# Patient Record
Sex: MALE | Race: WHITE | NOT HISPANIC OR LATINO | URBAN - METROPOLITAN AREA
[De-identification: names, ages, dates, MRNs, and addresses within clinical notes are randomized per-mention and may not be internally consistent; named-entity substitution may affect disease eponyms.]

---

## 2023-03-20 ENCOUNTER — OFFICE VISIT (OUTPATIENT)
Dept: URGENT CARE | Facility: CLINIC | Age: 48
End: 2023-03-20
Payer: COMMERCIAL

## 2023-03-20 VITALS
HEIGHT: 71 IN | SYSTOLIC BLOOD PRESSURE: 135 MMHG | WEIGHT: 175 LBS | RESPIRATION RATE: 18 BRPM | HEART RATE: 81 BPM | TEMPERATURE: 99 F | BODY MASS INDEX: 24.5 KG/M2 | DIASTOLIC BLOOD PRESSURE: 91 MMHG | OXYGEN SATURATION: 99 %

## 2023-03-20 DIAGNOSIS — M54.16 LUMBAR BACK PAIN WITH RADICULOPATHY AFFECTING RIGHT LOWER EXTREMITY: ICD-10-CM

## 2023-03-20 DIAGNOSIS — J02.0 STREP THROAT: ICD-10-CM

## 2023-03-20 DIAGNOSIS — J02.9 SORE THROAT: Primary | ICD-10-CM

## 2023-03-20 DIAGNOSIS — M79.669 CALF PAIN, UNSPECIFIED LATERALITY: ICD-10-CM

## 2023-03-20 LAB
CTP QC/QA: YES
MOLECULAR STREP A: POSITIVE

## 2023-03-20 PROCEDURE — 96372 PR INJECTION,THERAP/PROPH/DIAG2ST, IM OR SUBCUT: ICD-10-PCS | Mod: ,,,

## 2023-03-20 PROCEDURE — 96372 THER/PROPH/DIAG INJ SC/IM: CPT | Mod: ,,,

## 2023-03-20 PROCEDURE — 99213 PR OFFICE/OUTPT VISIT, EST, LEVL III, 20-29 MIN: ICD-10-PCS | Mod: 25,,,

## 2023-03-20 PROCEDURE — 99213 OFFICE O/P EST LOW 20 MIN: CPT | Mod: 25,,,

## 2023-03-20 PROCEDURE — 87651 POCT STREP A MOLECULAR: ICD-10-PCS | Mod: QW,,,

## 2023-03-20 PROCEDURE — 87651 STREP A DNA AMP PROBE: CPT | Mod: QW,,,

## 2023-03-20 RX ORDER — DICLOFENAC SODIUM 50 MG/1
50 TABLET, DELAYED RELEASE ORAL 3 TIMES DAILY
Qty: 9 TABLET | Refills: 0 | Status: SHIPPED | OUTPATIENT
Start: 2023-03-20 | End: 2023-03-23

## 2023-03-20 RX ORDER — CYCLOBENZAPRINE HCL 5 MG
5 TABLET ORAL 3 TIMES DAILY PRN
Qty: 15 TABLET | Refills: 0 | Status: SHIPPED | OUTPATIENT
Start: 2023-03-20 | End: 2023-03-25

## 2023-03-20 RX ORDER — DEXAMETHASONE SODIUM PHOSPHATE 100 MG/10ML
8 INJECTION INTRAMUSCULAR; INTRAVENOUS
Status: COMPLETED | OUTPATIENT
Start: 2023-03-20 | End: 2023-03-20

## 2023-03-20 RX ORDER — AMOXICILLIN 500 MG/1
500 CAPSULE ORAL EVERY 12 HOURS
Qty: 20 CAPSULE | Refills: 0 | Status: SHIPPED | OUTPATIENT
Start: 2023-03-20 | End: 2023-03-30

## 2023-03-20 RX ADMIN — DEXAMETHASONE SODIUM PHOSPHATE 8 MG: 100 INJECTION INTRAMUSCULAR; INTRAVENOUS at 01:03

## 2023-03-20 NOTE — PROGRESS NOTES
"Subjective:       Patient ID: Benny Ontiveros is a 47 y.o. male.    Vitals:  height is 5' 11" (1.803 m) and weight is 79.4 kg (175 lb). His temperature is 98.6 °F (37 °C). His blood pressure is 135/91 (abnormal) and his pulse is 81. His respiration is 18 and oxygen saturation is 99%.     Chief Complaint: No chief complaint on file.    A 47 y.o. male presents to clinic w/ c/o 2 lumps w/ pain in R lower leg x2wks, concerned about possible DVT. 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 Positive strep exposure (wife). Alleviating factors used include Tylenol and Ibuprofen w/ minimal relief.  He denies any chest pain, sob, cp, n/v/d, abdominal complaints, rash, difficulty swallowing, neck stiffness, or changes in intake or output. He denies any saddle anesthesia, extremity weakness, sudden loss of bowel or bladder function, temperature or sensation changes to the right lower extremity.       Constitution: Positive for fever.   HENT:  Positive for sore throat. Negative for trouble swallowing.    Neck: neck negative.   Cardiovascular: Negative.    Eyes: Negative.    Respiratory:  Negative for cough, shortness of breath, wheezing and asthma.    Endocrine: negative.   Genitourinary:  Negative for dysuria, urine decreased and bladder incontinence.   Musculoskeletal:  Positive for back pain, pain with walking and muscle ache.   Skin: Negative.    Allergic/Immunologic: Negative for asthma.     Objective:      Physical Exam   Constitutional: He is oriented to person, place, and time. He appears well-developed. He is cooperative.  Non-toxic appearance. He does not appear ill. No distress.   HENT:   Head: Normocephalic and atraumatic.   Ears:   Right Ear: Hearing, tympanic membrane and external ear normal.   Left Ear: Hearing, tympanic membrane and external ear normal.   Nose: No congestion. "   Mouth/Throat: Mucous membranes are normal. Mucous membranes are moist. Posterior oropharyngeal erythema present. Oropharynx is clear.   Eyes: Conjunctivae and lids are normal.   Neck: Trachea normal and phonation normal. Neck supple. No edema present. No erythema present. No neck rigidity present.   Cardiovascular: Normal rate, regular rhythm and normal heart sounds.   Pulmonary/Chest: Effort normal and breath sounds normal. No stridor. No respiratory distress. He has no decreased breath sounds. He has no wheezes. He has no rhonchi. He has no rales.   Abdominal: Normal appearance.   Musculoskeletal:      Lumbar back: He exhibits tenderness (Tenderness (Tenderness to palpation bilateral lower back upper buttock area 5/5 strength in the bilateral lower extremities)) and spasm. He exhibits normal range of motion, no swelling, no edema and no deformity.      Right lower leg: He exhibits tenderness. He exhibits no bony tenderness and no swelling. No edema.      Comments: Patient reports intermittent right calf pain, redness and swelling x 2 weeks. He reports a history of a DVT in the past but does not take any blood thinners and has not had an ultrasound in years. Negative homans     No redness, warmth, or swelling to the calf today    Neurological: He is alert and oriented to person, place, and time. He exhibits normal muscle tone.   Skin: Skin is warm, intact and not diaphoretic. Capillary refill takes less than 2 seconds.   Psychiatric: His speech is normal and behavior is normal. Mood normal.   Nursing note and vitals reviewed.      Assessment:       1. Sore throat    2. Calf pain, unspecified laterality    3. Strep throat    4. Lumbar back pain with radiculopathy affecting right lower extremity            Plan:         Sore throat  -     POCT Strep A, Molecular    Calf pain, unspecified laterality    Strep throat    Lumbar back pain with radiculopathy affecting right lower extremity    Other orders  -      amoxicillin (AMOXIL) 500 MG capsule; Take 1 capsule (500 mg total) by mouth every 12 (twelve) hours. for 10 days  Dispense: 20 capsule; Refill: 0  -     cyclobenzaprine (FLEXERIL) 5 MG tablet; Take 1 tablet (5 mg total) by mouth 3 (three) times daily as needed for Muscle spasms.  Dispense: 15 tablet; Refill: 0  -     diclofenac (VOLTAREN) 50 MG EC tablet; Take 1 tablet (50 mg total) by mouth 3 (three) times daily. for 3 days  Dispense: 9 tablet; Refill: 0  -     dexAMETHasone injection 8 mg    Strep    Medications sent to pharmacy  Monitor for fever  Tylenol or ibuprofen as needed  Warm saltwater gargles  Do not share any food cups drinks or utensils with anybody.  Change your toothbrush after 2 days of antibiotics  Hydrate  Return to clinic or seek medical attention immediately if your symptoms persist or worsen    Back pain   Take medications only as prescribed as they may cause sedation (safety precautions given).   Do not take any other NSAIDS with the diclofenac example: motrin, Advil, ibuprofen  Drink plenty of fluids and get plenty of rest.  Take medication with food.   Lower back stretches daily.  Avoid strenuous lifting, use proper body mechanics.  Apply heating pad, Ice pack, Biofreeze or Epsom salt baths as needed.  Encourage exercise to strengthen core muscles to support your back.  Ortho referral sent today   Present to the Emergency Department with any significant change or worsening symptoms.\    Leg pain  It was advised that you go to the ER of your choice today to rule out a DVT

## 2023-03-20 NOTE — PATIENT INSTRUCTIONS
Strep  Medications sent to pharmacy  Monitor for fever  Tylenol or ibuprofen as needed  Warm saltwater gargles  Do not share any food cups drinks or utensils with anybody.  Change your toothbrush after 2 days of antibiotics  Hydrate  Return to clinic or seek medical attention immediately if your symptoms persist or worsen    Back pain   Take medications only as prescribed as they may cause sedation (safety precautions given).   Drink plenty of fluids and get plenty of rest.  Take medication with food.   Lower back stretches daily.  Avoid strenuous lifting, use proper body mechanics.  Apply heating pad, Ice pack, Biofreeze or Epsom salt baths as needed.  Encourage exercise to strengthen core muscles to support your back.  Ortho referral sent today   Present to the Emergency Department with any significant change or worsening symptoms.\    Leg pain  It was advised that you go to the ER of your choice today to rule out a DVT